# Patient Record
Sex: FEMALE | Race: BLACK OR AFRICAN AMERICAN | NOT HISPANIC OR LATINO | Employment: OTHER | URBAN - METROPOLITAN AREA
[De-identification: names, ages, dates, MRNs, and addresses within clinical notes are randomized per-mention and may not be internally consistent; named-entity substitution may affect disease eponyms.]

---

## 2020-02-07 ENCOUNTER — APPOINTMENT (OUTPATIENT)
Dept: CT IMAGING | Facility: HOSPITAL | Age: 50
End: 2020-02-07

## 2020-02-07 ENCOUNTER — HOSPITAL ENCOUNTER (EMERGENCY)
Facility: HOSPITAL | Age: 50
Discharge: HOME | End: 2020-02-07
Admitting: EMERGENCY MEDICINE

## 2020-02-07 VITALS
BODY MASS INDEX: 43.4 KG/M2 | SYSTOLIC BLOOD PRESSURE: 149 MMHG | DIASTOLIC BLOOD PRESSURE: 87 MMHG | HEART RATE: 62 BPM | RESPIRATION RATE: 14 BRPM | HEIGHT: 69 IN | TEMPERATURE: 98.2 F | OXYGEN SATURATION: 99 % | WEIGHT: 293 LBS

## 2020-02-07 DIAGNOSIS — S39.012A STRAIN OF LUMBAR REGION, INITIAL ENCOUNTER: Primary | ICD-10-CM

## 2020-02-07 LAB
BACTERIA UR QL AUTO: ABNORMAL /HPF
BASOPHILS # BLD AUTO: 0.1 10*3/MM3 (ref 0–0.2)
BASOPHILS NFR BLD AUTO: 1.1 % (ref 0–1.5)
BILIRUB UR QL STRIP: NEGATIVE
CLARITY UR: CLEAR
COLOR UR: YELLOW
DEPRECATED RDW RBC AUTO: 44.2 FL (ref 37–54)
EOSINOPHIL # BLD AUTO: 0.1 10*3/MM3 (ref 0–0.4)
EOSINOPHIL NFR BLD AUTO: 1.2 % (ref 0.3–6.2)
ERYTHROCYTE [DISTWIDTH] IN BLOOD BY AUTOMATED COUNT: 15 % (ref 12.3–15.4)
GLUCOSE UR STRIP-MCNC: NEGATIVE MG/DL
HCT VFR BLD AUTO: 43.6 % (ref 34–46.6)
HGB BLD-MCNC: 14.5 G/DL (ref 12–15.9)
HGB UR QL STRIP.AUTO: ABNORMAL
HYALINE CASTS UR QL AUTO: ABNORMAL /LPF
KETONES UR QL STRIP: NEGATIVE
LEUKOCYTE ESTERASE UR QL STRIP.AUTO: NEGATIVE
LIPASE SERPL-CCNC: 11 U/L (ref 13–60)
LYMPHOCYTES # BLD AUTO: 1.6 10*3/MM3 (ref 0.7–3.1)
LYMPHOCYTES NFR BLD AUTO: 20.5 % (ref 19.6–45.3)
MCH RBC QN AUTO: 27.7 PG (ref 26.6–33)
MCHC RBC AUTO-ENTMCNC: 33.3 G/DL (ref 31.5–35.7)
MCV RBC AUTO: 83.2 FL (ref 79–97)
MONOCYTES # BLD AUTO: 0.4 10*3/MM3 (ref 0.1–0.9)
MONOCYTES NFR BLD AUTO: 5.3 % (ref 5–12)
NEUTROPHILS # BLD AUTO: 5.7 10*3/MM3 (ref 1.7–7)
NEUTROPHILS NFR BLD AUTO: 71.9 % (ref 42.7–76)
NITRITE UR QL STRIP: NEGATIVE
NRBC BLD AUTO-RTO: 0.4 /100 WBC (ref 0–0.2)
PH UR STRIP.AUTO: 5.5 [PH] (ref 5–8)
PLATELET # BLD AUTO: 203 10*3/MM3 (ref 140–450)
PMV BLD AUTO: 7.8 FL (ref 6–12)
PROT UR QL STRIP: NEGATIVE
RBC # BLD AUTO: 5.23 10*6/MM3 (ref 3.77–5.28)
RBC # UR: ABNORMAL /HPF
REF LAB TEST METHOD: ABNORMAL
SP GR UR STRIP: 1.01 (ref 1–1.03)
SQUAMOUS #/AREA URNS HPF: ABNORMAL /HPF
UROBILINOGEN UR QL STRIP: ABNORMAL
WBC NRBC COR # BLD: 7.9 10*3/MM3 (ref 3.4–10.8)
WBC UR QL AUTO: ABNORMAL /HPF

## 2020-02-07 PROCEDURE — 25010000002 HYDROMORPHONE PER 4 MG: Performed by: EMERGENCY MEDICINE

## 2020-02-07 PROCEDURE — 96374 THER/PROPH/DIAG INJ IV PUSH: CPT

## 2020-02-07 PROCEDURE — 25010000002 KETOROLAC TROMETHAMINE PER 15 MG: Performed by: PHYSICIAN ASSISTANT

## 2020-02-07 PROCEDURE — 99283 EMERGENCY DEPT VISIT LOW MDM: CPT

## 2020-02-07 PROCEDURE — 74176 CT ABD & PELVIS W/O CONTRAST: CPT

## 2020-02-07 PROCEDURE — 81001 URINALYSIS AUTO W/SCOPE: CPT | Performed by: PHYSICIAN ASSISTANT

## 2020-02-07 PROCEDURE — 25010000002 PROMETHAZINE PER 50 MG: Performed by: EMERGENCY MEDICINE

## 2020-02-07 PROCEDURE — 96375 TX/PRO/DX INJ NEW DRUG ADDON: CPT

## 2020-02-07 PROCEDURE — 83690 ASSAY OF LIPASE: CPT | Performed by: PHYSICIAN ASSISTANT

## 2020-02-07 PROCEDURE — 85025 COMPLETE CBC W/AUTO DIFF WBC: CPT | Performed by: PHYSICIAN ASSISTANT

## 2020-02-07 RX ORDER — SODIUM CHLORIDE 0.9 % (FLUSH) 0.9 %
10 SYRINGE (ML) INJECTION AS NEEDED
Status: DISCONTINUED | OUTPATIENT
Start: 2020-02-07 | End: 2020-02-07 | Stop reason: HOSPADM

## 2020-02-07 RX ORDER — LIDOCAINE 50 MG/G
1 PATCH TOPICAL EVERY 24 HOURS
Qty: 30 PATCH | Refills: 0 | Status: SHIPPED | OUTPATIENT
Start: 2020-02-07 | End: 2021-06-13

## 2020-02-07 RX ORDER — HYDROMORPHONE HCL 110MG/55ML
0.5 PATIENT CONTROLLED ANALGESIA SYRINGE INTRAVENOUS ONCE
Status: COMPLETED | OUTPATIENT
Start: 2020-02-07 | End: 2020-02-07

## 2020-02-07 RX ORDER — LIDOCAINE 50 MG/G
1 PATCH TOPICAL ONCE
Status: DISCONTINUED | OUTPATIENT
Start: 2020-02-07 | End: 2020-02-07 | Stop reason: HOSPADM

## 2020-02-07 RX ORDER — METHOCARBAMOL 500 MG/1
500 TABLET, FILM COATED ORAL ONCE
Status: COMPLETED | OUTPATIENT
Start: 2020-02-07 | End: 2020-02-07

## 2020-02-07 RX ORDER — METHYLPREDNISOLONE 4 MG/1
TABLET ORAL
Qty: 21 TABLET | Refills: 0 | Status: SHIPPED | OUTPATIENT
Start: 2020-02-07 | End: 2021-06-13

## 2020-02-07 RX ORDER — DICLOFENAC SODIUM 75 MG/1
75 TABLET, DELAYED RELEASE ORAL 2 TIMES DAILY
Qty: 60 TABLET | Refills: 0 | Status: SHIPPED | OUTPATIENT
Start: 2020-02-07 | End: 2021-06-13

## 2020-02-07 RX ORDER — KETOROLAC TROMETHAMINE 15 MG/ML
15 INJECTION, SOLUTION INTRAMUSCULAR; INTRAVENOUS ONCE
Status: COMPLETED | OUTPATIENT
Start: 2020-02-07 | End: 2020-02-07

## 2020-02-07 RX ADMIN — SODIUM CHLORIDE 12.5 MG: 900 INJECTION, SOLUTION INTRAVENOUS at 12:56

## 2020-02-07 RX ADMIN — HYDROMORPHONE HYDROCHLORIDE 0.5 MG: 2 INJECTION, SOLUTION INTRAMUSCULAR; INTRAVENOUS; SUBCUTANEOUS at 12:56

## 2020-02-07 RX ADMIN — SODIUM CHLORIDE 1000 ML: 900 INJECTION, SOLUTION INTRAVENOUS at 12:20

## 2020-02-07 RX ADMIN — METHOCARBAMOL TABLETS 500 MG: 500 TABLET, COATED ORAL at 12:15

## 2020-02-07 RX ADMIN — KETOROLAC TROMETHAMINE 15 MG: 15 INJECTION, SOLUTION INTRAMUSCULAR; INTRAVENOUS at 12:20

## 2020-02-07 RX ADMIN — LIDOCAINE 1 PATCH: 50 PATCH CUTANEOUS at 12:12

## 2020-02-07 NOTE — ED PROVIDER NOTES
Subjective   History:  Patient is a 49-year-old female who presents to the ER with right flank and low back pain over the last 2 days.  She reports that she had a menstrual cycle which she has not had in several years and had low back pain and thought it was cramping but her pain has gotten worse and not better.  Denies any other symptoms.  Denies any leg radiation.  Has no history of nephrolithiasis.  Denies any bowel or bladder incontinence.  Reports she went to urgent care yesterday and had a urine dipstick done which was negative.    Onset: 2 days  Location: right flank/low back  Duration: constant  Character: sharp pain   Aggravating/Alleviating factors: None  Radiation Right buttock  Severity: moderate            Review of Systems   Constitutional: Negative for fatigue and fever.   HENT: Negative.    Respiratory: Negative for cough and shortness of breath.    Cardiovascular: Negative for chest pain and leg swelling.   Gastrointestinal: Negative for abdominal distention, abdominal pain, nausea and vomiting.   Genitourinary: Negative.    Musculoskeletal: Positive for back pain.   Skin: Negative.    Neurological: Negative.    Psychiatric/Behavioral: Negative.        Past Medical History:   Diagnosis Date   • A-fib (CMS/[facility])    • Anxiety        Allergies   Allergen Reactions   • Penicillins Swelling       No past surgical history on file.    Family History   Problem Relation Age of Onset   • Hypertension Mother    • Hypertension Father    • Diabetes Father    • Breast cancer Maternal Grandmother    • Diabetes Paternal Grandmother        Social History     Socioeconomic History   • Marital status:      Spouse name: Not on file   • Number of children: Not on file   • Years of education: Not on file   • Highest education level: Not on file   Tobacco Use   • Smoking status: Never Smoker   Substance and Sexual Activity   • Alcohol use: Yes     Comment: occ/social           Objective   Physical Exam    Constitutional: She is oriented to person, place, and time. She appears well-developed and well-nourished.   HENT:   Head: Normocephalic and atraumatic.   Eyes: Pupils are equal, round, and reactive to light.   Neck: Normal range of motion.   Pulmonary/Chest: Effort normal.   Musculoskeletal: Normal range of motion.   No increase in pain with palpation of low back.  Some moderate increase in pain with flexion extension.   Neurological: She is alert and oriented to person, place, and time.   Skin: Skin is warm and dry.   Psychiatric: She has a normal mood and affect. Her behavior is normal.       Procedures           ED Course      Ct Abdomen Pelvis Without Contrast    Result Date: 2/7/2020   1. No acute findings in the abdomen or pelvis. No urinary tract stone or hydronephrosis.    Electronically Signed By-Dr. Abigail Muniz MD On:2/7/2020 1:25 PM This report was finalized on [id number] by Dr. Abigail Muniz MD.    Labs Reviewed   URINALYSIS W/ CULTURE IF INDICATED - Abnormal; Notable for the following components:       Result Value    Blood, UA Moderate (2+) (*)     All other components within normal limits   LIPASE - Abnormal; Notable for the following components:    Lipase 11 (*)     All other components within normal limits   CBC WITH AUTO DIFFERENTIAL - Abnormal; Notable for the following components:    nRBC 0.4 (*)     All other components within normal limits   URINALYSIS, MICROSCOPIC ONLY - Abnormal; Notable for the following components:    RBC, UA 0-2 (*)     All other components within normal limits   CBC AND DIFFERENTIAL    Narrative:     The following orders were created for panel order CBC & Differential.  Procedure                               Abnormality         Status                     ---------                               -----------         ------                     CBC Auto Differential[[id number]]        Abnormal            Final result                 Please view results for these  tests on the individual orders.     Medications   sodium chloride 0.9 % flush 10 mL (has no administration in time range)   lidocaine (LIDODERM) 5 % 1 patch (1 patch Transdermal Medication Applied 2/7/20 1212)   sodium chloride 0.9 % bolus 1,000 mL (1,000 mL Intravenous New Bag 2/7/20 1220)   ketorolac (TORADOL) injection 15 mg (15 mg Intravenous Given 2/7/20 1220)   methocarbamol (ROBAXIN) tablet 500 mg (500 mg Oral Given 2/7/20 1215)   promethazine (PHENERGAN) 12.5 mg in sodium chloride 0.9 % 50 mL (12.5 mg Intravenous Given 2/7/20 1256)   HYDROmorphone (DILAUDID) injection 0.5 mg (0.5 mg Intravenous Given 2/7/20 1256)                                              MDM  Number of Diagnoses or Management Options  Strain of lumbar region, initial encounter:   Diagnosis management comments: DISPOSITION:   Chart Review:  Comorbidity:  has a past medical history of A-fib (CMS/HCC) and Anxiety.  Differentials:this list is not all inclusive and does not constitute the entirety of considered causes --> Lumbar strain, UTI, nephrolithiasis, herniated disc  Labs: lipase 11, UA hematuria     Imaging: Was interpreted by physician and reviewed by myself:  Ct Abdomen Pelvis Without Contrast    Result Date: 2/7/2020   1. No acute findings in the abdomen or pelvis. No urinary tract stone or hydronephrosis.    Electronically Signed By-Dr. Abigail Muniz MD On:2/7/2020 1:25 PM This report was finalized on [id number] by Dr. Abigail Muniz MD.      Disposition/Treatment:  Patient is a 49-year-old female presents to the ER with right low back pain.  She did have hematuria in her urine CT scan was negative.  Patient was initially given Toradol and Robaxin without significant relief.  She was given Dilaudid.  She was discharged home with diclofenac lidocaine patches and Medrol Dosepak she is already on Flexeril.  She was told to follow-up with her PCP and given referrals.  She was stable at time of discharge return precaution follow-up  instruction provided she was stable and in agreement plan       Amount and/or Complexity of Data Reviewed  Clinical lab tests: reviewed  Tests in the radiology section of CPT®: reviewed    Patient Progress  Patient progress: stable      Final diagnoses:   Strain of lumbar region, initial encounter            Filomena East PA-C  02/07/20 2041

## 2020-02-07 NOTE — DISCHARGE INSTRUCTIONS
Return to the ER for any worsening symptoms  Follow up with your primary care provider for any further management. If you do not have a PCP see the above referrals.   Ice/heat for 15 minutes at a time for next two days.

## 2020-11-03 ENCOUNTER — APPOINTMENT (OUTPATIENT)
Dept: GENERAL RADIOLOGY | Facility: HOSPITAL | Age: 50
End: 2020-11-03

## 2020-11-03 ENCOUNTER — HOSPITAL ENCOUNTER (EMERGENCY)
Facility: HOSPITAL | Age: 50
Discharge: HOME | End: 2020-11-03
Admitting: EMERGENCY MEDICINE

## 2020-11-03 VITALS
RESPIRATION RATE: 18 BRPM | TEMPERATURE: 98.4 F | BODY MASS INDEX: 44.41 KG/M2 | HEART RATE: 84 BPM | HEIGHT: 68 IN | WEIGHT: 293 LBS | OXYGEN SATURATION: 95 % | SYSTOLIC BLOOD PRESSURE: 139 MMHG | DIASTOLIC BLOOD PRESSURE: 71 MMHG

## 2020-11-03 DIAGNOSIS — R06.09 DYSPNEA ON EXERTION: ICD-10-CM

## 2020-11-03 DIAGNOSIS — Z86.79 HISTORY OF ATRIAL FIBRILLATION: ICD-10-CM

## 2020-11-03 DIAGNOSIS — U07.1 CLINICAL DIAGNOSIS OF COVID-19: Primary | ICD-10-CM

## 2020-11-03 DIAGNOSIS — R00.2 PALPITATIONS: ICD-10-CM

## 2020-11-03 LAB
ALBUMIN SERPL-MCNC: 3.6 G/DL (ref 3.5–5.2)
ALBUMIN/GLOB SERPL: 0.9 G/DL
ALP SERPL-CCNC: 73 U/L (ref 39–117)
ALT SERPL W P-5'-P-CCNC: 32 U/L (ref 1–33)
ANION GAP SERPL CALCULATED.3IONS-SCNC: 11 MMOL/L (ref 5–15)
AST SERPL-CCNC: 35 U/L (ref 1–32)
BASOPHILS # BLD AUTO: 0 10*3/MM3 (ref 0–0.2)
BASOPHILS NFR BLD AUTO: 0.8 % (ref 0–1.5)
BILIRUB SERPL-MCNC: 0.4 MG/DL (ref 0–1.2)
BUN SERPL-MCNC: 7 MG/DL (ref 6–20)
BUN/CREAT SERPL: 8.4 (ref 7–25)
CALCIUM SPEC-SCNC: 8.6 MG/DL (ref 8.6–10.5)
CHLORIDE SERPL-SCNC: 98 MMOL/L (ref 98–107)
CO2 SERPL-SCNC: 29 MMOL/L (ref 22–29)
CREAT SERPL-MCNC: 0.83 MG/DL (ref 0.57–1)
D DIMER PPP FEU-MCNC: 0.23 MG/L (FEU) (ref 0–0.59)
DEPRECATED RDW RBC AUTO: 42.9 FL (ref 37–54)
EOSINOPHIL # BLD AUTO: 0 10*3/MM3 (ref 0–0.4)
EOSINOPHIL NFR BLD AUTO: 0.2 % (ref 0.3–6.2)
ERYTHROCYTE [DISTWIDTH] IN BLOOD BY AUTOMATED COUNT: 14.9 % (ref 12.3–15.4)
GFR SERPL CREATININE-BSD FRML MDRD: 87 ML/MIN/1.73
GLOBULIN UR ELPH-MCNC: 3.8 GM/DL
GLUCOSE SERPL-MCNC: 108 MG/DL (ref 65–99)
HCT VFR BLD AUTO: 40.9 % (ref 34–46.6)
HGB BLD-MCNC: 13.2 G/DL (ref 12–15.9)
HOLD SPECIMEN: NORMAL
LYMPHOCYTES # BLD AUTO: 0.9 10*3/MM3 (ref 0.7–3.1)
LYMPHOCYTES NFR BLD AUTO: 15.4 % (ref 19.6–45.3)
MCH RBC QN AUTO: 26.7 PG (ref 26.6–33)
MCHC RBC AUTO-ENTMCNC: 32.2 G/DL (ref 31.5–35.7)
MCV RBC AUTO: 83 FL (ref 79–97)
MONOCYTES # BLD AUTO: 0.3 10*3/MM3 (ref 0.1–0.9)
MONOCYTES NFR BLD AUTO: 5.2 % (ref 5–12)
NEUTROPHILS NFR BLD AUTO: 4.7 10*3/MM3 (ref 1.7–7)
NEUTROPHILS NFR BLD AUTO: 78.4 % (ref 42.7–76)
NRBC BLD AUTO-RTO: 0.1 /100 WBC (ref 0–0.2)
NT-PROBNP SERPL-MCNC: 15.7 PG/ML (ref 0–900)
PLATELET # BLD AUTO: 126 10*3/MM3 (ref 140–450)
PMV BLD AUTO: 8.3 FL (ref 6–12)
POTASSIUM SERPL-SCNC: 3.9 MMOL/L (ref 3.5–5.2)
PROT SERPL-MCNC: 7.4 G/DL (ref 6–8.5)
RBC # BLD AUTO: 4.93 10*6/MM3 (ref 3.77–5.28)
SODIUM SERPL-SCNC: 138 MMOL/L (ref 136–145)
TROPONIN T SERPL-MCNC: <0.01 NG/ML (ref 0–0.03)
WBC # BLD AUTO: 6 10*3/MM3 (ref 3.4–10.8)

## 2020-11-03 PROCEDURE — 71045 X-RAY EXAM CHEST 1 VIEW: CPT

## 2020-11-03 PROCEDURE — 85379 FIBRIN DEGRADATION QUANT: CPT | Performed by: NURSE PRACTITIONER

## 2020-11-03 PROCEDURE — 85025 COMPLETE CBC W/AUTO DIFF WBC: CPT | Performed by: NURSE PRACTITIONER

## 2020-11-03 PROCEDURE — 93005 ELECTROCARDIOGRAM TRACING: CPT | Performed by: NURSE PRACTITIONER

## 2020-11-03 PROCEDURE — 99284 EMERGENCY DEPT VISIT MOD MDM: CPT

## 2020-11-03 PROCEDURE — 96374 THER/PROPH/DIAG INJ IV PUSH: CPT

## 2020-11-03 PROCEDURE — 99283 EMERGENCY DEPT VISIT LOW MDM: CPT

## 2020-11-03 PROCEDURE — 84484 ASSAY OF TROPONIN QUANT: CPT | Performed by: NURSE PRACTITIONER

## 2020-11-03 PROCEDURE — 25010000002 METHYLPREDNISOLONE PER 125 MG: Performed by: NURSE PRACTITIONER

## 2020-11-03 PROCEDURE — 83880 ASSAY OF NATRIURETIC PEPTIDE: CPT | Performed by: NURSE PRACTITIONER

## 2020-11-03 PROCEDURE — 80053 COMPREHEN METABOLIC PANEL: CPT | Performed by: NURSE PRACTITIONER

## 2020-11-03 RX ORDER — SODIUM CHLORIDE 0.9 % (FLUSH) 0.9 %
10 SYRINGE (ML) INJECTION AS NEEDED
Status: DISCONTINUED | OUTPATIENT
Start: 2020-11-03 | End: 2020-11-03 | Stop reason: HOSPADM

## 2020-11-03 RX ORDER — PREDNISONE 20 MG/1
20 TABLET ORAL DAILY
Qty: 5 TABLET | Refills: 0 | Status: SHIPPED | OUTPATIENT
Start: 2020-11-03 | End: 2021-06-13

## 2020-11-03 RX ORDER — METHYLPREDNISOLONE SODIUM SUCCINATE 125 MG/2ML
125 INJECTION, POWDER, LYOPHILIZED, FOR SOLUTION INTRAMUSCULAR; INTRAVENOUS ONCE
Status: COMPLETED | OUTPATIENT
Start: 2020-11-03 | End: 2020-11-03

## 2020-11-03 RX ADMIN — METHYLPREDNISOLONE SODIUM SUCCINATE 125 MG: 125 INJECTION, POWDER, FOR SOLUTION INTRAMUSCULAR; INTRAVENOUS at 15:55

## 2020-11-03 NOTE — ED PROVIDER NOTES
Subjective   Is a 50-year-old morbidly obese female who states that she was tested and diagnosed with COVID-19 last Wednesday.-She states she has been doing well at home but recently she has had some palpitations her anxiety has increased and she feels more short of breath than normal.  She has had no fever no chills no cough or congestion.-She states she has pain that is chronic she states she always has pain that is all over in her body and mainly in her joints.  He rates it a 6/10.  Denies exacerbating or alleviating factors          Review of Systems   Constitutional: Negative for chills, fatigue and fever.   HENT: Negative for congestion, tinnitus and trouble swallowing.    Eyes: Negative for photophobia, discharge and redness.   Respiratory: Positive for shortness of breath. Negative for cough.    Cardiovascular: Positive for palpitations. Negative for chest pain.   Gastrointestinal: Negative for abdominal pain, diarrhea, nausea and vomiting.   Genitourinary: Negative for dysuria, frequency and urgency.   Musculoskeletal: Negative for back pain, joint swelling and myalgias.   Skin: Negative for rash.   Neurological: Negative for dizziness and headaches.   Psychiatric/Behavioral: Negative for confusion.   All other systems reviewed and are negative.      Past Medical History:   Diagnosis Date   • A-fib (CMS/[facility])    • Anxiety        Allergies   Allergen Reactions   • Penicillins Swelling       History reviewed. No pertinent surgical history.    Family History   Problem Relation Age of Onset   • Hypertension Mother    • Hypertension Father    • Diabetes Father    • Breast cancer Maternal Grandmother    • Diabetes Paternal Grandmother        Social History     Socioeconomic History   • Marital status:      Spouse name: Not on file   • Number of children: Not on file   • Years of education: Not on file   • Highest education level: Not on file   Tobacco Use   • Smoking status: Never Smoker   Substance and  Sexual Activity   • Alcohol use: Yes     Comment: occ/social           Objective   Physical Exam  Vitals signs reviewed.   Constitutional:       Appearance: She is well-developed. She is obese.   HENT:      Head: Normocephalic and atraumatic.      Mouth/Throat:      Mouth: Mucous membranes are moist.   Eyes:      Conjunctiva/sclera: Conjunctivae normal.      Pupils: Pupils are equal, round, and reactive to light.   Neck:      Musculoskeletal: Normal range of motion and neck supple.   Cardiovascular:      Rate and Rhythm: Normal rate and regular rhythm.      Heart sounds: Normal heart sounds.   Pulmonary:      Effort: Pulmonary effort is normal. No respiratory distress.      Breath sounds: Examination of the right-lower field reveals decreased breath sounds. Examination of the left-lower field reveals decreased breath sounds. Decreased breath sounds present. No wheezing.   Chest:      Chest wall: Crepitus present. No mass, deformity or tenderness.   Abdominal:      General: Bowel sounds are normal. There is no distension.      Palpations: Abdomen is soft. There is no mass.      Tenderness: There is no abdominal tenderness. There is no guarding or rebound.   Musculoskeletal: Normal range of motion.         General: No deformity.      Right lower leg: No edema.      Left lower leg: She exhibits no tenderness. No edema.   Skin:     General: Skin is warm and dry.      Capillary Refill: Capillary refill takes less than 2 seconds.   Neurological:      General: No focal deficit present.      Mental Status: She is alert and oriented to person, place, and time.      GCS: GCS eye subscore is 4. GCS verbal subscore is 5. GCS motor subscore is 6.      Cranial Nerves: No cranial nerve deficit.      Sensory: No sensory deficit.      Deep Tendon Reflexes: Reflexes normal.   Psychiatric:         Mood and Affect: Mood is anxious.         Behavior: Behavior normal.         Procedures         EKG shows sinus rhythm with a rate of 95 no  ectopy no ST elevation no previous was obtained are available-reviewed by myself read by Dr. Lima  ED Course                                               MDM    Final diagnoses:   Clinical diagnosis of COVID-19   Dyspnea on exertion   Palpitations   History of atrial fibrillation            Karmen Chapa, APRN  11/08/20 2003

## 2020-11-03 NOTE — ED NOTES
At this time the patient is on room air and her O2 is at 98%.  Patient denies using oxygen at home.     Kaylan Cronin RN  11/03/20 1533

## 2020-11-03 NOTE — ED NOTES
Patient states that she feels short of breath all the time and she states she is having heart palpitations.  Patient states that she tested COVID positive on Wednesday October the 28th and started having symptoms on the Oz the 25th.        Kaylan Cronin, RN  11/03/20 1531

## 2020-11-06 LAB — QT INTERVAL: 354 MS

## 2021-06-13 ENCOUNTER — APPOINTMENT (OUTPATIENT)
Dept: GENERAL RADIOLOGY | Facility: HOSPITAL | Age: 51
End: 2021-06-13

## 2021-06-13 ENCOUNTER — APPOINTMENT (OUTPATIENT)
Dept: CT IMAGING | Facility: HOSPITAL | Age: 51
End: 2021-06-13

## 2021-06-13 ENCOUNTER — HOSPITAL ENCOUNTER (OUTPATIENT)
Facility: HOSPITAL | Age: 51
Setting detail: OBSERVATION
Discharge: HOME | End: 2021-06-14
Attending: EMERGENCY MEDICINE | Admitting: EMERGENCY MEDICINE

## 2021-06-13 DIAGNOSIS — R55 SYNCOPE, UNSPECIFIED SYNCOPE TYPE: Primary | ICD-10-CM

## 2021-06-13 DIAGNOSIS — S82.55XA NONDISPLACED FRACTURE OF MEDIAL MALLEOLUS OF LEFT TIBIA, INITIAL ENCOUNTER FOR CLOSED FRACTURE: ICD-10-CM

## 2021-06-13 DIAGNOSIS — S80.02XA CONTUSION OF LEFT KNEE, INITIAL ENCOUNTER: ICD-10-CM

## 2021-06-13 LAB
ABO GROUP BLD: NORMAL
ALBUMIN SERPL-MCNC: 4.1 G/DL (ref 3.5–5.2)
ALBUMIN/GLOB SERPL: 1.1 G/DL
ALP SERPL-CCNC: 101 U/L (ref 39–117)
ALT SERPL W P-5'-P-CCNC: 11 U/L (ref 1–33)
ANION GAP SERPL CALCULATED.3IONS-SCNC: 13 MMOL/L (ref 5–15)
APTT PPP: 24.6 SECONDS (ref 24–31)
AST SERPL-CCNC: 15 U/L (ref 1–32)
BASOPHILS # BLD AUTO: 0.1 10*3/MM3 (ref 0–0.2)
BASOPHILS NFR BLD AUTO: 0.6 % (ref 0–1.5)
BILIRUB SERPL-MCNC: 0.3 MG/DL (ref 0–1.2)
BLD GP AB SCN SERPL QL: NEGATIVE
BUN SERPL-MCNC: 16 MG/DL (ref 6–20)
BUN/CREAT SERPL: 15.5 (ref 7–25)
CALCIUM SPEC-SCNC: 9.3 MG/DL (ref 8.6–10.5)
CHLORIDE SERPL-SCNC: 101 MMOL/L (ref 98–107)
CO2 SERPL-SCNC: 24 MMOL/L (ref 22–29)
CREAT SERPL-MCNC: 1.03 MG/DL (ref 0.57–1)
DEPRECATED RDW RBC AUTO: 45.1 FL (ref 37–54)
EOSINOPHIL # BLD AUTO: 0.1 10*3/MM3 (ref 0–0.4)
EOSINOPHIL NFR BLD AUTO: 0.8 % (ref 0.3–6.2)
ERYTHROCYTE [DISTWIDTH] IN BLOOD BY AUTOMATED COUNT: 15.5 % (ref 12.3–15.4)
GFR SERPL CREATININE-BSD FRML MDRD: 68 ML/MIN/1.73
GLOBULIN UR ELPH-MCNC: 3.9 GM/DL
GLUCOSE SERPL-MCNC: 69 MG/DL (ref 65–99)
HCT VFR BLD AUTO: 40.2 % (ref 34–46.6)
HGB BLD-MCNC: 13.1 G/DL (ref 12–15.9)
INR PPP: 0.96 (ref 0.93–1.1)
LYMPHOCYTES # BLD AUTO: 1.8 10*3/MM3 (ref 0.7–3.1)
LYMPHOCYTES NFR BLD AUTO: 15.6 % (ref 19.6–45.3)
MCH RBC QN AUTO: 27.4 PG (ref 26.6–33)
MCHC RBC AUTO-ENTMCNC: 32.7 G/DL (ref 31.5–35.7)
MCV RBC AUTO: 83.8 FL (ref 79–97)
MONOCYTES # BLD AUTO: 0.7 10*3/MM3 (ref 0.1–0.9)
MONOCYTES NFR BLD AUTO: 6.3 % (ref 5–12)
NEUTROPHILS NFR BLD AUTO: 76.7 % (ref 42.7–76)
NEUTROPHILS NFR BLD AUTO: 8.8 10*3/MM3 (ref 1.7–7)
NRBC BLD AUTO-RTO: 0 /100 WBC (ref 0–0.2)
NT-PROBNP SERPL-MCNC: 19.4 PG/ML (ref 0–900)
PLATELET # BLD AUTO: 187 10*3/MM3 (ref 140–450)
PMV BLD AUTO: 8.2 FL (ref 6–12)
POTASSIUM SERPL-SCNC: 3.9 MMOL/L (ref 3.5–5.2)
PROT SERPL-MCNC: 8 G/DL (ref 6–8.5)
PROTHROMBIN TIME: 10.6 SECONDS (ref 9.6–11.7)
RBC # BLD AUTO: 4.79 10*6/MM3 (ref 3.77–5.28)
RH BLD: POSITIVE
SARS-COV-2 RNA PNL SPEC NAA+PROBE: NOT DETECTED
SODIUM SERPL-SCNC: 138 MMOL/L (ref 136–145)
T&S EXPIRATION DATE: NORMAL
TROPONIN T SERPL-MCNC: <0.01 NG/ML (ref 0–0.03)
WBC # BLD AUTO: 11.4 10*3/MM3 (ref 3.4–10.8)

## 2021-06-13 PROCEDURE — 83880 ASSAY OF NATRIURETIC PEPTIDE: CPT | Performed by: EMERGENCY MEDICINE

## 2021-06-13 PROCEDURE — 99285 EMERGENCY DEPT VISIT HI MDM: CPT

## 2021-06-13 PROCEDURE — 86850 RBC ANTIBODY SCREEN: CPT | Performed by: EMERGENCY MEDICINE

## 2021-06-13 PROCEDURE — 85730 THROMBOPLASTIN TIME PARTIAL: CPT | Performed by: EMERGENCY MEDICINE

## 2021-06-13 PROCEDURE — 86901 BLOOD TYPING SEROLOGIC RH(D): CPT

## 2021-06-13 PROCEDURE — 73564 X-RAY EXAM KNEE 4 OR MORE: CPT

## 2021-06-13 PROCEDURE — 36415 COLL VENOUS BLD VENIPUNCTURE: CPT | Performed by: EMERGENCY MEDICINE

## 2021-06-13 PROCEDURE — C9803 HOPD COVID-19 SPEC COLLECT: HCPCS

## 2021-06-13 PROCEDURE — G0378 HOSPITAL OBSERVATION PER HR: HCPCS

## 2021-06-13 PROCEDURE — 85025 COMPLETE CBC W/AUTO DIFF WBC: CPT | Performed by: EMERGENCY MEDICINE

## 2021-06-13 PROCEDURE — 86900 BLOOD TYPING SEROLOGIC ABO: CPT

## 2021-06-13 PROCEDURE — 70450 CT HEAD/BRAIN W/O DYE: CPT

## 2021-06-13 PROCEDURE — 86900 BLOOD TYPING SEROLOGIC ABO: CPT | Performed by: EMERGENCY MEDICINE

## 2021-06-13 PROCEDURE — 84484 ASSAY OF TROPONIN QUANT: CPT | Performed by: EMERGENCY MEDICINE

## 2021-06-13 PROCEDURE — 71045 X-RAY EXAM CHEST 1 VIEW: CPT

## 2021-06-13 PROCEDURE — 80053 COMPREHEN METABOLIC PANEL: CPT | Performed by: EMERGENCY MEDICINE

## 2021-06-13 PROCEDURE — 86901 BLOOD TYPING SEROLOGIC RH(D): CPT | Performed by: EMERGENCY MEDICINE

## 2021-06-13 PROCEDURE — 85610 PROTHROMBIN TIME: CPT | Performed by: EMERGENCY MEDICINE

## 2021-06-13 PROCEDURE — U0003 INFECTIOUS AGENT DETECTION BY NUCLEIC ACID (DNA OR RNA); SEVERE ACUTE RESPIRATORY SYNDROME CORONAVIRUS 2 (SARS-COV-2) (CORONAVIRUS DISEASE [COVID-19]), AMPLIFIED PROBE TECHNIQUE, MAKING USE OF HIGH THROUGHPUT TECHNOLOGIES AS DESCRIBED BY CMS-2020-01-R: HCPCS | Performed by: EMERGENCY MEDICINE

## 2021-06-13 PROCEDURE — 73610 X-RAY EXAM OF ANKLE: CPT

## 2021-06-13 PROCEDURE — 93005 ELECTROCARDIOGRAM TRACING: CPT | Performed by: EMERGENCY MEDICINE

## 2021-06-13 RX ORDER — ACETAMINOPHEN 325 MG/1
650 TABLET ORAL EVERY 4 HOURS PRN
Status: DISCONTINUED | OUTPATIENT
Start: 2021-06-13 | End: 2021-06-14 | Stop reason: HOSPADM

## 2021-06-13 RX ORDER — SODIUM CHLORIDE 0.9 % (FLUSH) 0.9 %
10 SYRINGE (ML) INJECTION AS NEEDED
Status: DISCONTINUED | OUTPATIENT
Start: 2021-06-13 | End: 2021-06-14 | Stop reason: HOSPADM

## 2021-06-13 RX ORDER — HYDROCODONE BITARTRATE AND ACETAMINOPHEN 10; 325 MG/1; MG/1
1 TABLET ORAL EVERY 4 HOURS PRN
Status: DISCONTINUED | OUTPATIENT
Start: 2021-06-13 | End: 2021-06-14 | Stop reason: HOSPADM

## 2021-06-13 RX ORDER — HYDROCODONE BITARTRATE AND ACETAMINOPHEN 7.5; 325 MG/1; MG/1
1 TABLET ORAL ONCE
Status: COMPLETED | OUTPATIENT
Start: 2021-06-13 | End: 2021-06-13

## 2021-06-13 RX ORDER — ONDANSETRON 2 MG/ML
4 INJECTION INTRAMUSCULAR; INTRAVENOUS EVERY 6 HOURS PRN
Status: DISCONTINUED | OUTPATIENT
Start: 2021-06-13 | End: 2021-06-14 | Stop reason: HOSPADM

## 2021-06-13 RX ORDER — CHOLECALCIFEROL (VITAMIN D3) 125 MCG
5 CAPSULE ORAL NIGHTLY PRN
Status: DISCONTINUED | OUTPATIENT
Start: 2021-06-13 | End: 2021-06-14 | Stop reason: HOSPADM

## 2021-06-13 RX ORDER — SODIUM CHLORIDE 0.9 % (FLUSH) 0.9 %
10 SYRINGE (ML) INJECTION EVERY 12 HOURS SCHEDULED
Status: DISCONTINUED | OUTPATIENT
Start: 2021-06-13 | End: 2021-06-14 | Stop reason: HOSPADM

## 2021-06-13 RX ADMIN — HYDROCODONE BITARTRATE AND ACETAMINOPHEN 1 TABLET: 7.5; 325 TABLET ORAL at 22:07

## 2021-06-13 RX ADMIN — SODIUM CHLORIDE 1000 ML: 9 INJECTION, SOLUTION INTRAVENOUS at 19:39

## 2021-06-13 RX ADMIN — Medication 10 ML: at 22:50

## 2021-06-13 NOTE — ED PROVIDER NOTES
Subjective   History of Present Illness  Syncope  50-year-old female states she was shopping today and started feeling lightheaded had a slight bit of nausea and had a brief syncopal spell with a friend and the Nexium she remembered she was on the floor.  States she does have some knee pain from falling but denies headache or chest pain or palpitation or shortness of breath.  She reports no abdominal pain or fevers or chills or vomiting or diarrhea.  She states she feels fine currently other than the knee pain.  She denies numbness or weakness in the extremities  Review of Systems   Constitutional: Negative.    HENT: Negative.    Eyes: Negative.    Respiratory: Negative.    Cardiovascular: Negative.    Gastrointestinal: Negative.    Genitourinary: Negative.    Musculoskeletal: Negative.    Skin: Negative.    Neurological: Positive for syncope. Negative for seizures, weakness, numbness and headaches.   Psychiatric/Behavioral: Negative.        Past Medical History:   Diagnosis Date   • A-fib (CMS/[facility])    • Anxiety        Allergies   Allergen Reactions   • Penicillins Swelling       No past surgical history on file.    Family History   Problem Relation Age of Onset   • Hypertension Mother    • Hypertension Father    • Diabetes Father    • Breast cancer Maternal Grandmother    • Diabetes Paternal Grandmother        Social History     Socioeconomic History   • Marital status:      Spouse name: Not on file   • Number of children: Not on file   • Years of education: Not on file   • Highest education level: Not on file   Tobacco Use   • Smoking status: Never Smoker   Substance and Sexual Activity   • Alcohol use: Yes     Comment: occ/social     Prior to Admission medications    Medication Sig Start Date End Date Taking? Authorizing Provider   apixaban (ELIQUIS) 5 MG tablet tablet TAKE 1 TABLET BY MOUTH TWO TIMES A DAY 8/22/19   Emergency, Nurse Epic, RN   cyclobenzaprine (FLEXERIL) 5 MG tablet 1 pill at night for the  "next 10 days for pain, will cause drowsiness 9/10/19   Gato Kuo MD   diclofenac (VOLTAREN) 75 MG EC tablet Take 1 tablet by mouth 2 (Two) Times a Day. 2/7/20   Filomena East PA-C   lidocaine (LIDODERM) 5 % Place 1 patch on the skin as directed by provider Daily. Remove & Discard patch within 12 hours or as directed by MD 2/7/20   Filomena East PA-C   methylPREDNISolone (MEDROL, ROCK,) 4 MG tablet Take as directed on package instructions. 2/7/20   Filomena East PA-C   metoprolol succinate XL (TOPROL-XL) 100 MG 24 hr tablet Take 100 mg by mouth Daily. 3/20/19   Emergency, Nurse Epic, RN   PARoxetine CR (PAXIL-CR) 12.5 MG 24 hr tablet Take 12.5 mg by mouth Daily.    Emergency, Nurse HARMEET Faria   predniSONE (DELTASONE) 20 MG tablet Take 1 tablet by mouth Daily. 11/3/20   Karmen Chapa, APRN     /74   Pulse 97   Temp 98.1 °F (36.7 °C)   Resp 17   Ht 172.7 cm (68\")   Wt (!) 147 kg (324 lb)   SpO2 100%   BMI 49.26 kg/m²   I examined the patient using the appropriate personal protective equipment.          Objective   Physical Exam  General: Obese female no acute distress, awake alert and pleasant  Eyes: Pupils round and reactive, sclera nonicteric  HEENT: Mucous membranes moist, no mucosal swelling, normocephalic, atraumatic  Neck: Supple, no nuchal rigidity, no lymphadenopathy  Respirations: Respirations nonlabored, equal breath sounds bilaterally, clear lungs  Heart regular rate and rhythm, no murmurs rubs or gallops,   Abdomen soft nontender nondistended, no hepatosplenomegaly,   Extremities no clubbing cyanosis or edema, calves are symmetric and nontender; there is some mild tenderness with patient in the anterior aspect the left knee, no apparent ligamentous laxity normal quadriceps and patellar tendon strength, no deformity, hip exam normal, some mild soft tissue swelling about the left ankle, calves and thighs are symmetric and nontender, normal pulses and sensorimotor function " distally  Neuro cranial nerves II through XII intact , normal sensory/motor function and strength in four extremities, no slurred speech, no facial droop, normal finger to nose, normal heel to shin, no nuchal rigidity  Psych oriented, pleasant affect  Skin no rash, brisk cap refill  Procedures           ED Course      Results for orders placed or performed during the hospital encounter of 06/13/21   Comprehensive Metabolic Panel    Specimen: Blood   Result Value Ref Range    Glucose 69 65 - 99 mg/dL    BUN 16 6 - 20 mg/dL    Creatinine 1.03 (H) 0.57 - 1.00 mg/dL    Sodium 138 136 - 145 mmol/L    Potassium 3.9 3.5 - 5.2 mmol/L    Chloride 101 98 - 107 mmol/L    CO2 24.0 22.0 - 29.0 mmol/L    Calcium 9.3 8.6 - 10.5 mg/dL    Total Protein 8.0 6.0 - 8.5 g/dL    Albumin 4.10 3.50 - 5.20 g/dL    ALT (SGPT) 11 1 - 33 U/L    AST (SGOT) 15 1 - 32 U/L    Alkaline Phosphatase 101 39 - 117 U/L    Total Bilirubin 0.3 0.0 - 1.2 mg/dL    eGFR  African Amer 68 >60 mL/min/1.73    Globulin 3.9 gm/dL    A/G Ratio 1.1 g/dL    BUN/Creatinine Ratio 15.5 7.0 - 25.0    Anion Gap 13.0 5.0 - 15.0 mmol/L   Troponin    Specimen: Blood   Result Value Ref Range    Troponin T <0.010 0.000 - 0.030 ng/mL   BNP    Specimen: Blood   Result Value Ref Range    proBNP 19.4 0.0 - 900.0 pg/mL   Protime-INR    Specimen: Blood   Result Value Ref Range    Protime 10.6 9.6 - 11.7 Seconds    INR 0.96 0.93 - 1.10   aPTT    Specimen: Blood   Result Value Ref Range    PTT 24.6 24.0 - 31.0 seconds   CBC Auto Differential    Specimen: Blood   Result Value Ref Range    WBC 11.40 (H) 3.40 - 10.80 10*3/mm3    RBC 4.79 3.77 - 5.28 10*6/mm3    Hemoglobin 13.1 12.0 - 15.9 g/dL    Hematocrit 40.2 34.0 - 46.6 %    MCV 83.8 79.0 - 97.0 fL    MCH 27.4 26.6 - 33.0 pg    MCHC 32.7 31.5 - 35.7 g/dL    RDW 15.5 (H) 12.3 - 15.4 %    RDW-SD 45.1 37.0 - 54.0 fl    MPV 8.2 6.0 - 12.0 fL    Platelets 187 140 - 450 10*3/mm3    Neutrophil % 76.7 (H) 42.7 - 76.0 %    Lymphocyte % 15.6  (L) 19.6 - 45.3 %    Monocyte % 6.3 5.0 - 12.0 %    Eosinophil % 0.8 0.3 - 6.2 %    Basophil % 0.6 0.0 - 1.5 %    Neutrophils, Absolute 8.80 (H) 1.70 - 7.00 10*3/mm3    Lymphocytes, Absolute 1.80 0.70 - 3.10 10*3/mm3    Monocytes, Absolute 0.70 0.10 - 0.90 10*3/mm3    Eosinophils, Absolute 0.10 0.00 - 0.40 10*3/mm3    Basophils, Absolute 0.10 0.00 - 0.20 10*3/mm3    nRBC 0.0 0.0 - 0.2 /100 WBC   ECG 12 Lead   Result Value Ref Range    QT Interval 351 ms   Type & Screen    Specimen: Blood   Result Value Ref Range    ABO Type B     RH type Positive     Antibody Screen Negative     T&S Expiration Date 6/16/2021 11:59:59 PM      XR Knee 4+ View Left    Result Date: 6/13/2021   Degenerative changes of the left knee.  No acute bony abnormality or joint effusion.  Electronically Signed By-Domingo Quiroz MD On:6/13/2021 7:46 PM This report was finalized on [id number] by  Domingo Quiroz MD.    XR Ankle 3+ View Left    Result Date: 6/13/2021   1.  Acute nondisplaced fracture the distal tip of the medial malleolus. 2.  Small bony fragment along the distal tip of the lateral malleolus which may represent a small age-indeterminate avulsion fracture.  No lateral soft tissue swelling.  Electronically Signed By-Domingo Quiroz MD On:6/13/2021 7:48 PM This report was finalized on [id number] by  Domingo Quiroz MD.    XR Chest 1 View    Result Date: 6/13/2021   1. No acute cardiopulmonary disease.   Electronically Signed By-Domingo Quiroz MD On:6/13/2021 7:49 PM This report was finalized on [id number] by  Domingo Quiroz MD.         My EKG interpretation sinus rhythm rate of 99, PVC                                MDM  Patient was advised the findings.  She was ordered Aircast and crutches for the small avulsion at the medial malleolus.  There is no apparent instability at the ankle.  The knee x-ray shows no acute bony injury.  She was given pain medication in the emergency room for discomfort.  She remained hemodynamically  stable during the emergency room course.  EKG shows no acute ischemic changes, initial troponin is normal.  She is not describing chest pain or shortness of breath or any focal neurologic symptoms to suggest stroke.  She will be placed in further observation for overnight cardiac monitoring serial cardiac enzymes and 2D echocardiogram in the a.m.  She will be referred to orthopedics following discharge for her medial malleolus fracture.  Patient is agreeable to plan  Final diagnoses:   Syncope, unspecified syncope type   Nondisplaced fracture of medial malleolus of left tibia, initial encounter for closed fracture   Contusion of left knee, initial encounter       ED Disposition  ED Disposition     ED Disposition Condition Comment    Decision to Admit            No follow-up provider specified.       Medication List      No changes were made to your prescriptions during this visit.          Max Lima MD  06/13/21 0608

## 2021-06-14 ENCOUNTER — APPOINTMENT (OUTPATIENT)
Dept: CARDIOLOGY | Facility: HOSPITAL | Age: 51
End: 2021-06-14

## 2021-06-14 ENCOUNTER — APPOINTMENT (OUTPATIENT)
Dept: RESPIRATORY THERAPY | Facility: HOSPITAL | Age: 51
End: 2021-06-14

## 2021-06-14 VITALS
HEART RATE: 77 BPM | BODY MASS INDEX: 44.41 KG/M2 | SYSTOLIC BLOOD PRESSURE: 128 MMHG | WEIGHT: 293 LBS | TEMPERATURE: 97.8 F | OXYGEN SATURATION: 99 % | HEIGHT: 68 IN | DIASTOLIC BLOOD PRESSURE: 70 MMHG | RESPIRATION RATE: 14 BRPM

## 2021-06-14 LAB
ANION GAP SERPL CALCULATED.3IONS-SCNC: 11 MMOL/L (ref 5–15)
BASOPHILS # BLD AUTO: 0.1 10*3/MM3 (ref 0–0.2)
BASOPHILS NFR BLD AUTO: 1 % (ref 0–1.5)
BH CV ECHO MEAS - % IVS THICK: 41.4 %
BH CV ECHO MEAS - % LVPW THICK: 25.5 %
BH CV ECHO MEAS - ACS: 2 CM
BH CV ECHO MEAS - AO MAX PG (FULL): 0.12 MMHG
BH CV ECHO MEAS - AO MAX PG: 7.9 MMHG
BH CV ECHO MEAS - AO MEAN PG (FULL): -0.06 MMHG
BH CV ECHO MEAS - AO MEAN PG: 4.3 MMHG
BH CV ECHO MEAS - AO ROOT AREA (BSA CORRECTED): 1
BH CV ECHO MEAS - AO ROOT AREA: 5.5 CM^2
BH CV ECHO MEAS - AO ROOT DIAM: 2.7 CM
BH CV ECHO MEAS - AO V2 MAX: 140.2 CM/SEC
BH CV ECHO MEAS - AO V2 MEAN: 99.2 CM/SEC
BH CV ECHO MEAS - AO V2 VTI: 28.8 CM
BH CV ECHO MEAS - AVA(I,A): 3.4 CM^2
BH CV ECHO MEAS - AVA(I,D): 3.4 CM^2
BH CV ECHO MEAS - AVA(V,A): 2.9 CM^2
BH CV ECHO MEAS - AVA(V,D): 2.9 CM^2
BH CV ECHO MEAS - BSA(HAYCOCK): 2.8 M^2
BH CV ECHO MEAS - BSA: 2.5 M^2
BH CV ECHO MEAS - BZI_BMI: 50.8 KILOGRAMS/M^2
BH CV ECHO MEAS - BZI_METRIC_HEIGHT: 172.7 CM
BH CV ECHO MEAS - BZI_METRIC_WEIGHT: 151.5 KG
BH CV ECHO MEAS - EDV(CUBED): 117.3 ML
BH CV ECHO MEAS - EDV(MOD-SP4): 95.9 ML
BH CV ECHO MEAS - EDV(TEICH): 112.5 ML
BH CV ECHO MEAS - EF(CUBED): 80.1 %
BH CV ECHO MEAS - EF(MOD-BP): 67 %
BH CV ECHO MEAS - EF(MOD-SP4): 67.2 %
BH CV ECHO MEAS - EF(TEICH): 72.4 %
BH CV ECHO MEAS - ESV(CUBED): 23.3 ML
BH CV ECHO MEAS - ESV(MOD-SP4): 31.4 ML
BH CV ECHO MEAS - ESV(TEICH): 31.1 ML
BH CV ECHO MEAS - FS: 41.6 %
BH CV ECHO MEAS - IVS/LVPW: 1
BH CV ECHO MEAS - IVSD: 1.2 CM
BH CV ECHO MEAS - IVSS: 1.7 CM
BH CV ECHO MEAS - LA DIMENSION(2D): 4 CM
BH CV ECHO MEAS - LV DIASTOLIC VOL/BSA (35-75): 37.7 ML/M^2
BH CV ECHO MEAS - LV MASS(C)D: 228.3 GRAMS
BH CV ECHO MEAS - LV MASS(C)DI: 89.8 GRAMS/M^2
BH CV ECHO MEAS - LV MASS(C)S: 168.8 GRAMS
BH CV ECHO MEAS - LV MASS(C)SI: 66.4 GRAMS/M^2
BH CV ECHO MEAS - LV MAX PG: 7.7 MMHG
BH CV ECHO MEAS - LV MEAN PG: 4.3 MMHG
BH CV ECHO MEAS - LV SYSTOLIC VOL/BSA (12-30): 12.4 ML/M^2
BH CV ECHO MEAS - LV V1 MAX: 139.2 CM/SEC
BH CV ECHO MEAS - LV V1 MEAN: 97.4 CM/SEC
BH CV ECHO MEAS - LV V1 VTI: 33 CM
BH CV ECHO MEAS - LVIDD: 4.9 CM
BH CV ECHO MEAS - LVIDS: 2.9 CM
BH CV ECHO MEAS - LVOT AREA: 3 CM^2
BH CV ECHO MEAS - LVOT DIAM: 1.9 CM
BH CV ECHO MEAS - LVPWD: 1.2 CM
BH CV ECHO MEAS - LVPWS: 1.5 CM
BH CV ECHO MEAS - MV A MAX VEL: 67 CM/SEC
BH CV ECHO MEAS - MV DEC SLOPE: 515.8 CM/SEC^2
BH CV ECHO MEAS - MV DEC TIME: 0.2 SEC
BH CV ECHO MEAS - MV E MAX VEL: 52 CM/SEC
BH CV ECHO MEAS - MV E/A: 0.78
BH CV ECHO MEAS - MV MAX PG: 3.6 MMHG
BH CV ECHO MEAS - MV MEAN PG: 1.5 MMHG
BH CV ECHO MEAS - MV V2 MAX: 95.2 CM/SEC
BH CV ECHO MEAS - MV V2 MEAN: 57.1 CM/SEC
BH CV ECHO MEAS - MV V2 VTI: 25.5 CM
BH CV ECHO MEAS - MVA(VTI): 3.8 CM^2
BH CV ECHO MEAS - PA ACC TIME: 0.08 SEC
BH CV ECHO MEAS - PA MAX PG (FULL): 2.1 MMHG
BH CV ECHO MEAS - PA MAX PG: 4.6 MMHG
BH CV ECHO MEAS - PA MEAN PG (FULL): 1.3 MMHG
BH CV ECHO MEAS - PA MEAN PG: 2.6 MMHG
BH CV ECHO MEAS - PA PR(ACCEL): 44.7 MMHG
BH CV ECHO MEAS - PA V2 MAX: 106.9 CM/SEC
BH CV ECHO MEAS - PA V2 MEAN: 77.7 CM/SEC
BH CV ECHO MEAS - PA V2 VTI: 27.2 CM
BH CV ECHO MEAS - PULM A REVS DUR: 0.08 SEC
BH CV ECHO MEAS - PULM A REVS VEL: 33.8 CM/SEC
BH CV ECHO MEAS - PULM DIAS VEL: 31.1 CM/SEC
BH CV ECHO MEAS - PULM S/D: 1.7
BH CV ECHO MEAS - PULM SYS VEL: 53.5 CM/SEC
BH CV ECHO MEAS - PVA(I,A): 3.7 CM^2
BH CV ECHO MEAS - PVA(I,D): 3.7 CM^2
BH CV ECHO MEAS - PVA(V,A): 4.1 CM^2
BH CV ECHO MEAS - PVA(V,D): 4.1 CM^2
BH CV ECHO MEAS - QP/QS: 1
BH CV ECHO MEAS - RAP SYSTOLE: 15 MMHG
BH CV ECHO MEAS - RV MAX PG: 2.4 MMHG
BH CV ECHO MEAS - RV MEAN PG: 1.3 MMHG
BH CV ECHO MEAS - RV V1 MAX: 78.1 CM/SEC
BH CV ECHO MEAS - RV V1 MEAN: 52.5 CM/SEC
BH CV ECHO MEAS - RV V1 VTI: 17.8 CM
BH CV ECHO MEAS - RVDD: 2.6 CM
BH CV ECHO MEAS - RVOT AREA: 5.7 CM^2
BH CV ECHO MEAS - RVOT DIAM: 2.7 CM
BH CV ECHO MEAS - RVSP: 49 MMHG
BH CV ECHO MEAS - SI(AO): 62.7 ML/M^2
BH CV ECHO MEAS - SI(CUBED): 37 ML/M^2
BH CV ECHO MEAS - SI(LVOT): 38.4 ML/M^2
BH CV ECHO MEAS - SI(MOD-SP4): 25.4 ML/M^2
BH CV ECHO MEAS - SI(TEICH): 32 ML/M^2
BH CV ECHO MEAS - SV(AO): 159.4 ML
BH CV ECHO MEAS - SV(CUBED): 93.9 ML
BH CV ECHO MEAS - SV(LVOT): 97.6 ML
BH CV ECHO MEAS - SV(MOD-SP4): 64.5 ML
BH CV ECHO MEAS - SV(RVOT): 100.9 ML
BH CV ECHO MEAS - SV(TEICH): 81.5 ML
BH CV ECHO MEAS - TR MAX VEL: 291.7 CM/SEC
BUN SERPL-MCNC: 12 MG/DL (ref 6–20)
BUN/CREAT SERPL: 15.6 (ref 7–25)
CALCIUM SPEC-SCNC: 8.7 MG/DL (ref 8.6–10.5)
CHLORIDE SERPL-SCNC: 104 MMOL/L (ref 98–107)
CO2 SERPL-SCNC: 24 MMOL/L (ref 22–29)
CREAT SERPL-MCNC: 0.77 MG/DL (ref 0.57–1)
DEPRECATED RDW RBC AUTO: 45.5 FL (ref 37–54)
EOSINOPHIL # BLD AUTO: 0.1 10*3/MM3 (ref 0–0.4)
EOSINOPHIL NFR BLD AUTO: 0.9 % (ref 0.3–6.2)
ERYTHROCYTE [DISTWIDTH] IN BLOOD BY AUTOMATED COUNT: 15.6 % (ref 12.3–15.4)
GFR SERPL CREATININE-BSD FRML MDRD: 95 ML/MIN/1.73
GLUCOSE SERPL-MCNC: 99 MG/DL (ref 65–99)
HCT VFR BLD AUTO: 37.2 % (ref 34–46.6)
HGB BLD-MCNC: 12.4 G/DL (ref 12–15.9)
LYMPHOCYTES # BLD AUTO: 2.1 10*3/MM3 (ref 0.7–3.1)
LYMPHOCYTES NFR BLD AUTO: 22.2 % (ref 19.6–45.3)
MAXIMAL PREDICTED HEART RATE: 168 BPM
MCH RBC QN AUTO: 27.8 PG (ref 26.6–33)
MCHC RBC AUTO-ENTMCNC: 33.4 G/DL (ref 31.5–35.7)
MCV RBC AUTO: 83.2 FL (ref 79–97)
MONOCYTES # BLD AUTO: 0.5 10*3/MM3 (ref 0.1–0.9)
MONOCYTES NFR BLD AUTO: 5.5 % (ref 5–12)
NEUTROPHILS NFR BLD AUTO: 6.8 10*3/MM3 (ref 1.7–7)
NEUTROPHILS NFR BLD AUTO: 70.4 % (ref 42.7–76)
NRBC BLD AUTO-RTO: 0.2 /100 WBC (ref 0–0.2)
PLATELET # BLD AUTO: 162 10*3/MM3 (ref 140–450)
PMV BLD AUTO: 8 FL (ref 6–12)
POTASSIUM SERPL-SCNC: 4 MMOL/L (ref 3.5–5.2)
QT INTERVAL: 351 MS
RBC # BLD AUTO: 4.48 10*6/MM3 (ref 3.77–5.28)
SODIUM SERPL-SCNC: 139 MMOL/L (ref 136–145)
STRESS TARGET HR: 143 BPM
TROPONIN T SERPL-MCNC: <0.01 NG/ML (ref 0–0.03)
WBC # BLD AUTO: 9.6 10*3/MM3 (ref 3.4–10.8)

## 2021-06-14 PROCEDURE — 93306 TTE W/DOPPLER COMPLETE: CPT

## 2021-06-14 PROCEDURE — G0378 HOSPITAL OBSERVATION PER HR: HCPCS

## 2021-06-14 PROCEDURE — 93225 XTRNL ECG REC<48 HRS REC: CPT

## 2021-06-14 PROCEDURE — 80048 BASIC METABOLIC PNL TOTAL CA: CPT | Performed by: EMERGENCY MEDICINE

## 2021-06-14 PROCEDURE — 85025 COMPLETE CBC W/AUTO DIFF WBC: CPT | Performed by: EMERGENCY MEDICINE

## 2021-06-14 PROCEDURE — 84484 ASSAY OF TROPONIN QUANT: CPT | Performed by: EMERGENCY MEDICINE

## 2021-06-14 PROCEDURE — 93306 TTE W/DOPPLER COMPLETE: CPT | Performed by: INTERNAL MEDICINE

## 2021-06-14 RX ORDER — HYDROCODONE BITARTRATE AND ACETAMINOPHEN 7.5; 325 MG/1; MG/1
1 TABLET ORAL EVERY 6 HOURS PRN
Qty: 12 TABLET | Refills: 0 | Status: SHIPPED | OUTPATIENT
Start: 2021-06-14

## 2021-06-14 RX ADMIN — HYDROCODONE BITARTRATE AND ACETAMINOPHEN 1 TABLET: 10; 325 TABLET ORAL at 02:31

## 2021-06-14 RX ADMIN — Medication 10 ML: at 20:23

## 2021-06-14 RX ADMIN — HYDROCODONE BITARTRATE AND ACETAMINOPHEN 1 TABLET: 10; 325 TABLET ORAL at 07:57

## 2021-06-14 RX ADMIN — Medication 10 ML: at 08:42

## 2021-06-14 RX ADMIN — HYDROCODONE BITARTRATE AND ACETAMINOPHEN 1 TABLET: 10; 325 TABLET ORAL at 18:31

## 2021-06-14 RX ADMIN — SODIUM CHLORIDE, POTASSIUM CHLORIDE, SODIUM LACTATE AND CALCIUM CHLORIDE 500 ML: 600; 310; 30; 20 INJECTION, SOLUTION INTRAVENOUS at 08:42

## 2021-06-14 RX ADMIN — HYDROCODONE BITARTRATE AND ACETAMINOPHEN 1 TABLET: 10; 325 TABLET ORAL at 12:39

## 2021-06-14 NOTE — PLAN OF CARE
Problem: Adult Inpatient Plan of Care  Goal: Plan of Care Review  Outcome: Ongoing, Progressing  Goal: Patient-Specific Goal (Individualized)  Outcome: Ongoing, Progressing  Goal: Absence of Hospital-Acquired Illness or Injury  Outcome: Ongoing, Progressing  Intervention: Identify and Manage Fall Risk  Recent Flowsheet Documentation  Taken 6/14/2021 1309 by Chanelle Goodman RN  Safety Promotion/Fall Prevention:   assistive device/personal items within reach   clutter free environment maintained   nonskid shoes/slippers when out of bed   safety round/check completed  Taken 6/14/2021 1100 by Chanelle Goodman RN  Safety Promotion/Fall Prevention:   assistive device/personal items within reach   clutter free environment maintained   safety round/check completed  Intervention: Prevent Skin Injury  Recent Flowsheet Documentation  Taken 6/14/2021 1100 by Chanelle Goodman RN  Body Position:   position changed independently   supine  Skin Protection: adhesive use limited  Intervention: Prevent Infection  Recent Flowsheet Documentation  Taken 6/14/2021 1309 by Chanelle Goodman RN  Infection Prevention:   environmental surveillance performed   personal protective equipment utilized   rest/sleep promoted   single patient room provided  Taken 6/14/2021 1100 by Chanelle Goodman RN  Infection Prevention:   environmental surveillance performed   personal protective equipment utilized   rest/sleep promoted   single patient room provided  Goal: Optimal Comfort and Wellbeing  Outcome: Ongoing, Progressing  Intervention: Provide Person-Centered Care  Recent Flowsheet Documentation  Taken 6/14/2021 1100 by Chanelle Goodman RN  Trust Relationship/Rapport:   care explained   choices provided   questions answered  Goal: Readiness for Transition of Care  Outcome: Ongoing, Progressing     Problem: Fall Injury Risk  Goal: Absence of Fall and Fall-Related Injury  Outcome: Ongoing, Progressing  Intervention: Identify and Manage Contributors to Fall  Injury Risk  Recent Flowsheet Documentation  Taken 6/14/2021 1100 by Chanelle Goodman RN  Medication Review/Management: medications reviewed  Intervention: Promote Injury-Free Environment  Recent Flowsheet Documentation  Taken 6/14/2021 1309 by Chanelle Goodman RN  Safety Promotion/Fall Prevention:   assistive device/personal items within reach   clutter free environment maintained   nonskid shoes/slippers when out of bed   safety round/check completed  Taken 6/14/2021 1100 by Chanelle Goodman RN  Safety Promotion/Fall Prevention:   assistive device/personal items within reach   clutter free environment maintained   safety round/check completed     Problem: Syncope  Goal: Absence of Syncopal Symptoms  Outcome: Ongoing, Progressing  Intervention: Manage Effect of Syncopal Symptoms  Recent Flowsheet Documentation  Taken 6/14/2021 1100 by Chanelle Goodman RN  Syncope Management: position changed slowly  Supportive Measures: active listening utilized     Problem: Pain Acute  Goal: Optimal Pain Control  Outcome: Ongoing, Progressing  Intervention: Develop Pain Management Plan  Recent Flowsheet Documentation  Taken 6/14/2021 1239 by Chanelle Goodman RN  Pain Management Interventions: see MAR  Taken 6/14/2021 1100 by Chanelle Goodman RN  Pain Management Interventions:   medication offered but refused   pain management plan reviewed with patient/caregiver  Intervention: Prevent or Manage Pain  Recent Flowsheet Documentation  Taken 6/14/2021 1100 by Chanelle Goodman RN  Sensory Stimulation Regulation: care clustered  Sleep/Rest Enhancement: awakenings minimized  Intervention: Optimize Psychosocial Wellbeing  Recent Flowsheet Documentation  Taken 6/14/2021 1100 by Chanelle Goodman RN  Supportive Measures: active listening utilized  Diversional Activities: smartphone   Goal Outcome Evaluation:     ECHO done, awaiting results. Holter monitor placed. Vitals WNL. Will continue to monitor.

## 2021-06-14 NOTE — PLAN OF CARE
Problem: Adult Inpatient Plan of Care  Goal: Plan of Care Review  Outcome: Ongoing, Progressing  Flowsheets (Taken 6/13/2021 5100)  Progress: no change  Plan of Care Reviewed With: patient  Outcome Summary: Pt arrived from ED  Goal: Patient-Specific Goal (Individualized)  Outcome: Ongoing, Progressing  Goal: Absence of Hospital-Acquired Illness or Injury  Outcome: Ongoing, Progressing  Goal: Optimal Comfort and Wellbeing  Outcome: Ongoing, Progressing  Goal: Readiness for Transition of Care  Outcome: Ongoing, Progressing     Problem: Fall Injury Risk  Goal: Absence of Fall and Fall-Related Injury  Outcome: Ongoing, Progressing     Problem: Syncope  Goal: Absence of Syncopal Symptoms  Outcome: Ongoing, Progressing     Problem: Pain Acute  Goal: Optimal Pain Control  Outcome: Ongoing, Progressing   Goal Outcome Evaluation:  Plan of Care Reviewed With: patient        Progress: no change  Outcome Summary: Pt arrived from ED

## 2021-06-14 NOTE — PLAN OF CARE
Problem: Adult Inpatient Plan of Care  Goal: Plan of Care Review  6/14/2021 1710 by Chanelle Goodman RN  Outcome: Met  6/14/2021 1635 by Chanelle Goodman RN  Outcome: Ongoing, Progressing  Goal: Patient-Specific Goal (Individualized)  6/14/2021 1710 by Chanelle Goodman RN  Outcome: Met  6/14/2021 1635 by Chanelle Goodman RN  Outcome: Ongoing, Progressing  Goal: Absence of Hospital-Acquired Illness or Injury  6/14/2021 1710 by Chanelle Goodman RN  Outcome: Met  6/14/2021 1635 by Chanelle Goodman RN  Outcome: Ongoing, Progressing  Intervention: Identify and Manage Fall Risk  Recent Flowsheet Documentation  Taken 6/14/2021 1500 by Chanelle Goodman RN  Safety Promotion/Fall Prevention:   assistive device/personal items within reach   clutter free environment maintained   fall prevention program maintained   safety round/check completed  Taken 6/14/2021 1309 by Chanelle Goodman RN  Safety Promotion/Fall Prevention:   assistive device/personal items within reach   clutter free environment maintained   nonskid shoes/slippers when out of bed   safety round/check completed  Taken 6/14/2021 1100 by Chanelle Goodman RN  Safety Promotion/Fall Prevention:   assistive device/personal items within reach   clutter free environment maintained   safety round/check completed  Intervention: Prevent Skin Injury  Recent Flowsheet Documentation  Taken 6/14/2021 1500 by Chanelle Goodman RN  Body Position:   position changed independently   supine  Taken 6/14/2021 1100 by Chanelle Goodman RN  Body Position:   position changed independently   supine  Skin Protection: adhesive use limited  Intervention: Prevent Infection  Recent Flowsheet Documentation  Taken 6/14/2021 1500 by Chanelle Goodman RN  Infection Prevention:   environmental surveillance performed   personal protective equipment utilized   rest/sleep promoted   single patient room provided  Taken 6/14/2021 1309 by Chanelle Goodman RN  Infection Prevention:   environmental surveillance  performed   personal protective equipment utilized   rest/sleep promoted   single patient room provided  Taken 6/14/2021 1100 by Chanelle Goodman RN  Infection Prevention:   environmental surveillance performed   personal protective equipment utilized   rest/sleep promoted   single patient room provided  Goal: Optimal Comfort and Wellbeing  6/14/2021 1710 by Chanelle Goodman RN  Outcome: Met  6/14/2021 1635 by Chanelle Goodman RN  Outcome: Ongoing, Progressing  Intervention: Provide Person-Centered Care  Recent Flowsheet Documentation  Taken 6/14/2021 1100 by Chanelle Goodman RN  Trust Relationship/Rapport:   care explained   choices provided   questions answered  Goal: Readiness for Transition of Care  6/14/2021 1710 by Chanelle Goodman RN  Outcome: Met  6/14/2021 1635 by Chanelle Goodman RN  Outcome: Ongoing, Progressing     Problem: Fall Injury Risk  Goal: Absence of Fall and Fall-Related Injury  6/14/2021 1710 by Chanelle Goodman RN  Outcome: Met  6/14/2021 1635 by Chanelle Goodman RN  Outcome: Ongoing, Progressing  Intervention: Identify and Manage Contributors to Fall Injury Risk  Recent Flowsheet Documentation  Taken 6/14/2021 1500 by Chanelle Goodman RN  Medication Review/Management: medications reviewed  Taken 6/14/2021 1100 by Chanelle Goodman RN  Medication Review/Management: medications reviewed  Intervention: Promote Injury-Free Environment  Recent Flowsheet Documentation  Taken 6/14/2021 1500 by Chanelle Goodman RN  Safety Promotion/Fall Prevention:   assistive device/personal items within reach   clutter free environment maintained   fall prevention program maintained   safety round/check completed  Taken 6/14/2021 1309 by Chanelle Goodman RN  Safety Promotion/Fall Prevention:   assistive device/personal items within reach   clutter free environment maintained   nonskid shoes/slippers when out of bed   safety round/check completed  Taken 6/14/2021 1100 by Chanelle Goodman RN  Safety Promotion/Fall Prevention:    assistive device/personal items within reach   clutter free environment maintained   safety round/check completed     Problem: Syncope  Goal: Absence of Syncopal Symptoms  6/14/2021 1710 by Chanelle Goodman RN  Outcome: Met  6/14/2021 1635 by Chanelle Goodman RN  Outcome: Ongoing, Progressing  Intervention: Manage Effect of Syncopal Symptoms  Recent Flowsheet Documentation  Taken 6/14/2021 1100 by Chanelle Goodman RN  Syncope Management: position changed slowly  Supportive Measures: active listening utilized     Problem: Pain Acute  Goal: Optimal Pain Control  6/14/2021 1710 by Chanelle Goodman RN  Outcome: Met  6/14/2021 1635 by Chanelle Goodman RN  Outcome: Ongoing, Progressing  Intervention: Develop Pain Management Plan  Recent Flowsheet Documentation  Taken 6/14/2021 1239 by Chanelle Goodman RN  Pain Management Interventions: see MAR  Taken 6/14/2021 1100 by Chanelle Goodman RN  Pain Management Interventions:   medication offered but refused   pain management plan reviewed with patient/caregiver  Intervention: Prevent or Manage Pain  Recent Flowsheet Documentation  Taken 6/14/2021 1100 by Chanelle Goodman RN  Sensory Stimulation Regulation: care clustered  Sleep/Rest Enhancement: awakenings minimized  Intervention: Optimize Psychosocial Wellbeing  Recent Flowsheet Documentation  Taken 6/14/2021 1100 by Chanelle Goodman RN  Supportive Measures: active listening utilized  Diversional Activities: smartphone   Goal Outcome Evaluation:      Patient discharged.

## 2021-06-14 NOTE — DISCHARGE SUMMARY
[facility]    Kat Crystal APRN    CHIEF COMPLAINT:     Syncope    HISTORY OF PRESENT ILLNESS:    50-year-old female states she was shopping today and started feeling lightheaded had a slight bit of nausea and had a brief syncopal spell with a friend and the Nexium she remembered she was on the floor.  States she does have some knee pain from falling but denies headache or chest pain or palpitation or shortness of breath.  She reports no abdominal pain or fevers or chills or vomiting or diarrhea.  She states she feels fine currently other than the knee pain.  She denies numbness or weakness in the extremities    Initial labs notable for creatinine of 1.03 with a BUN of 16 and a BUN great ratio of 15.5, WBCs: 11.40 with an increased absolute neutrophil count noted on differential.  Remainder of CBC and CMP was generally unremarkable.  INR, PT and PTT were all within normal limits.  CT of head without contrast showed no acute intracranial process.  X-ray of left ankle showed an acute nondisplaced fracture at the distal tip of the medial malleolus with a small bony fragment along the distal tip of the lateral malleolus possibly representing a small age-indeterminate avulsion fracture with no lateral soft tissue swelling noted.  X-ray of left knee shows degenerative changes with no acute bony abnormality or joint effusion.  Chest x-ray showed no acute cardiopulmonary disease.  EKG showed sinus rhythm at 99 with 2 unifocal PVCs    6/14/2021: Patient confirms HPI noted above states that she experienced a short period of nausea prior to her syncopal episode and woke on the ground.  She has had some left ankle and knee pain since that time and left ankle continues immobilized with intact distal neurovascular function noted.  No recurrence of syncope, dyspnea, chest pain, nausea or vomiting, diaphoresis, fever chills or changes in bowel or bladder habits have occurred since her admission.    Past  Medical History:   Diagnosis Date   • A-fib (CMS/HCC)    • Anxiety      History reviewed. No pertinent surgical history.  Family History   Problem Relation Age of Onset   • Hypertension Mother    • Hypertension Father    • Diabetes Father    • Breast cancer Maternal Grandmother    • Diabetes Paternal Grandmother      Social History     Tobacco Use   • Smoking status: Never Smoker   Substance Use Topics   • Alcohol use: Yes     Comment: occ/social   • Drug use: Never     Medications Prior to Admission   Medication Sig Dispense Refill Last Dose   • apixaban (ELIQUIS) 5 MG tablet tablet TAKE 1 TABLET BY MOUTH TWO TIMES A DAY      • metoprolol succinate XL (TOPROL-XL) 100 MG 24 hr tablet Take 100 mg by mouth Daily.        Allergies:  Penicillins      There is no immunization history on file for this patient.        REVIEW OF SYSTEMS:    Review of Systems   Constitutional: Negative.   HENT: Negative.    Eyes: Negative.    Cardiovascular: Positive for syncope. Negative for orthopnea and palpitations.   Respiratory: Negative.    Endocrine: Negative.    Skin: Negative.    Musculoskeletal: Negative.    Gastrointestinal: Positive for nausea. Negative for abdominal pain, constipation, diarrhea, hematemesis, jaundice, melena and vomiting.   Psychiatric/Behavioral: Negative.        Vital Signs  Temp:  [97.7 °F (36.5 °C)-98.3 °F (36.8 °C)] 97.7 °F (36.5 °C)  Heart Rate:  [65-97] 65  Resp:  [16-18] 16  BP: ()/(51-80) 91/51          Physical Exam:  Physical Exam  Constitutional:       General: She is not in acute distress.     Appearance: Normal appearance. She is obese. She is not ill-appearing, toxic-appearing or diaphoretic.   HENT:      Head: Normocephalic and atraumatic.      Right Ear: External ear normal.      Left Ear: External ear normal.      Nose: Nose normal.      Mouth/Throat:      Mouth: Mucous membranes are moist.   Eyes:      Extraocular Movements: Extraocular movements intact.   Cardiovascular:      Rate and  Rhythm: Normal rate and regular rhythm.      Pulses: Normal pulses.      Heart sounds: Normal heart sounds.   Pulmonary:      Effort: Pulmonary effort is normal.      Breath sounds: Normal breath sounds.   Abdominal:      General: Bowel sounds are normal. There is no distension.      Tenderness: There is no abdominal tenderness.   Musculoskeletal:         General: Normal range of motion.      Cervical back: Normal range of motion.   Skin:     General: Skin is warm and dry.      Capillary Refill: Capillary refill takes less than 2 seconds.   Neurological:      General: No focal deficit present.      Mental Status: She is alert and oriented to person, place, and time.   Psychiatric:         Mood and Affect: Mood normal.         Behavior: Behavior normal.         Thought Content: Thought content normal.         Judgment: Judgment normal.         Emotional Behavior:   Normal   Debilities:  None  Results Review:    I reviewed the patient's new clinical results.  Lab Results (most recent)     Procedure Component Value Units Date/Time    Basic Metabolic Panel [[id number]]  (Normal) Collected: 06/14/21 0249    Specimen: Blood Updated: 06/14/21 0403     Glucose 99 mg/dL      BUN 12 mg/dL      Creatinine 0.77 mg/dL      Sodium 139 mmol/L      Potassium 4.0 mmol/L      Chloride 104 mmol/L      CO2 24.0 mmol/L      Calcium 8.7 mg/dL      eGFR  African Amer 95 mL/min/1.73      BUN/Creatinine Ratio 15.6     Anion Gap 11.0 mmol/L     Narrative:      GFR Normal >60  Chronic Kidney Disease <60  Kidney Failure <15      Troponin [[id number]]  (Normal) Collected: 06/14/21 0249    Specimen: Blood Updated: 06/14/21 0403     Troponin T <0.010 ng/mL     Narrative:      Troponin T Reference Range:  <= 0.03 ng/mL-   Negative for AMI  >0.03 ng/mL-     Abnormal for myocardial necrosis.  Clinicians would have to utilize clinical acumen, EKG, Troponin and serial changes to determine if it is an Acute Myocardial Infarction or myocardial injury due  to an underlying chronic condition.       Results may be falsely decreased if patient taking Biotin.      CBC Auto Differential [[id number]]  (Abnormal) Collected: 06/14/21 0249    Specimen: Blood Updated: 06/14/21 0338     WBC 9.60 10*3/mm3      RBC 4.48 10*6/mm3      Hemoglobin 12.4 g/dL      Hematocrit 37.2 %      MCV 83.2 fL      MCH 27.8 pg      MCHC 33.4 g/dL      RDW 15.6 %      RDW-SD 45.5 fl      MPV 8.0 fL      Platelets 162 10*3/mm3      Neutrophil % 70.4 %      Lymphocyte % 22.2 %      Monocyte % 5.5 %      Eosinophil % 0.9 %      Basophil % 1.0 %      Neutrophils, Absolute 6.80 10*3/mm3      Lymphocytes, Absolute 2.10 10*3/mm3      Monocytes, Absolute 0.50 10*3/mm3      Eosinophils, Absolute 0.10 10*3/mm3      Basophils, Absolute 0.10 10*3/mm3      nRBC 0.2 /100 WBC     COVID PRE-OP / PRE-PROCEDURE SCREENING ORDER (NO ISOLATION) - Swab, Nasopharynx [[id number]]  (Normal) Collected: 06/13/21 2210    Specimen: Swab from Nasopharynx Updated: 06/13/21 2314    Narrative:      The following orders were created for panel order COVID PRE-OP / PRE-PROCEDURE SCREENING ORDER (NO ISOLATION) - Swab, Nasopharynx.  Procedure                               Abnormality         Status                     ---------                               -----------         ------                     COVID-19,CEPHEID,COR/FAUSTINO...[[id number]]  Normal              Final result                 Please view results for these tests on the individual orders.    COVID-19,CEPHEID,COR/FAUSTINO/PAD/MARGARETH IN-HOUSE(OR EMERGENT/ADD-ON),NP SWAB IN TRANSPORT MEDIA 3-4 HR TAT, RT-PCR - Swab, Nasopharynx [[id number]]  (Normal) Collected: 06/13/21 2210    Specimen: Swab from Nasopharynx Updated: 06/13/21 2314     COVID19 Not Detected    Narrative:      Fact sheet for providers: https://www.fda.gov/media/413637/download     Fact sheet for patients: https://www.fda.gov/media/851627/download  Fact sheet for providers: https://www.fda.gov/media/999804/download          Fact sheet for patients: https://www.fda.gov/media/031769/download    Comprehensive Metabolic Panel [[id number]]  (Abnormal) Collected: 06/13/21 1938    Specimen: Blood Updated: 06/13/21 2010     Glucose 69 mg/dL      BUN 16 mg/dL      Creatinine 1.03 mg/dL      Sodium 138 mmol/L      Potassium 3.9 mmol/L      Chloride 101 mmol/L      CO2 24.0 mmol/L      Calcium 9.3 mg/dL      Total Protein 8.0 g/dL      Albumin 4.10 g/dL      ALT (SGPT) 11 U/L      AST (SGOT) 15 U/L      Alkaline Phosphatase 101 U/L      Total Bilirubin 0.3 mg/dL      eGFR  African Amer 68 mL/min/1.73      Globulin 3.9 gm/dL      A/G Ratio 1.1 g/dL      BUN/Creatinine Ratio 15.5     Anion Gap 13.0 mmol/L     Narrative:      GFR Normal >60  Chronic Kidney Disease <60  Kidney Failure <15      Troponin [[id number]]  (Normal) Collected: 06/13/21 1938    Specimen: Blood Updated: 06/13/21 2008     Troponin T <0.010 ng/mL     Narrative:      Troponin T Reference Range:  <= 0.03 ng/mL-   Negative for AMI  >0.03 ng/mL-     Abnormal for myocardial necrosis.  Clinicians would have to utilize clinical acumen, EKG, Troponin and serial changes to determine if it is an Acute Myocardial Infarction or myocardial injury due to an underlying chronic condition.       Results may be falsely decreased if patient taking Biotin.      BNP [[id number]]  (Normal) Collected: 06/13/21 1938    Specimen: Blood Updated: 06/13/21 2006     proBNP 19.4 pg/mL     Narrative:      Among patients with dyspnea, NT-proBNP is highly sensitive for the detection of acute congestive heart failure. In addition NT-proBNP of <300 pg/ml effectively rules out acute congestive heart failure with 99% negative predictive value.    Results may be falsely decreased if patient taking Biotin.      Protime-INR [[id number]]  (Normal) Collected: 06/13/21 1938    Specimen: Blood Updated: 06/13/21 1956     Protime 10.6 Seconds      INR 0.96    aPTT [[id number]]  (Normal) Collected: 06/13/21 1938    Specimen:  Blood Updated: 06/13/21 1956     PTT 24.6 seconds     CBC & Differential [[id number]]  (Abnormal) Collected: 06/13/21 1938    Specimen: Blood Updated: 06/13/21 1946    Narrative:      The following orders were created for panel order CBC & Differential.  Procedure                               Abnormality         Status                     ---------                               -----------         ------                     CBC Auto Differential[[id number]]        Abnormal            Final result                 Please view results for these tests on the individual orders.    CBC Auto Differential [[id number]]  (Abnormal) Collected: 06/13/21 1938    Specimen: Blood Updated: 06/13/21 1946     WBC 11.40 10*3/mm3      RBC 4.79 10*6/mm3      Hemoglobin 13.1 g/dL      Hematocrit 40.2 %      MCV 83.8 fL      MCH 27.4 pg      MCHC 32.7 g/dL      RDW 15.5 %      RDW-SD 45.1 fl      MPV 8.2 fL      Platelets 187 10*3/mm3      Neutrophil % 76.7 %      Lymphocyte % 15.6 %      Monocyte % 6.3 %      Eosinophil % 0.8 %      Basophil % 0.6 %      Neutrophils, Absolute 8.80 10*3/mm3      Lymphocytes, Absolute 1.80 10*3/mm3      Monocytes, Absolute 0.70 10*3/mm3      Eosinophils, Absolute 0.10 10*3/mm3      Basophils, Absolute 0.10 10*3/mm3      nRBC 0.0 /100 WBC           Imaging Results (Most Recent)     Procedure Component Value Units Date/Time    CT Head Without Contrast [[id number]] Collected: 06/13/21 2016     Updated: 06/13/21 2018    Narrative:      EXAMINATION: CT HEAD WITHOUT CONTRAST    DATE: 6/13/2021 7:57 PM     INDICATION: Fall. Syncope. Weakness.     COMPARISON: None.     TECHNIQUE:  Routine axial images through the head without contrast. Coronal reformations.    Low-dose CT acquisition technique included one or more of the following options: 1. Automated exposure control, 2. Adjustment of mA and/or KV according to patient's size and/or 3. Use of iterative reconstruction.    FINDINGS:      Intracranial contents:    The  ventricles and cisternal spaces are normal in size, shape and configuration for a patient of this age. Grey white differentiation is preserved.  No dominant mass, midline shift, hydrocephalus, extra-axial fluid collection or acute hemorrhage.      No asymmetric hyperdensity of the proximal major cerebral arteries.    Craniocervical junction is patent.    Bones and extracranial soft tissues:    Minor mucosal thickening ethmoid air cells. Otherwise, Visualized paranasal sinuses and mastoid air cells are clear.  Skull is intact. Visualized intraorbital contents are unremarkable. Degenerative changes temporomandibular joints.      Impression:      1. No acute intracranial process. MRI is more sensitive for the detection of acute nonhemorrhagic infarct.         Electronically signed by:  Awais Mcguire M.D.    6/13/2021 6:17 PM    XR Chest 1 View [[id number]] Collected: 06/13/21 1948     Updated: 06/13/21 1951    Narrative:      XR CHEST 1 VW-     Date of Exam: 6/13/2021 7:08 PM     Indication: syncope.     Comparison: 11/03/2020     Technique: A single view of the chest was obtained.     FINDINGS:      The heart size and pulmonary vessels are within normal limits. The  lungs are clear bilaterally. There are no pleural effusions. Bony thorax  is unremarkable.                Impression:         1. No acute cardiopulmonary disease.        Electronically Signed By-Domingo Quiroz MD On:6/13/2021 7:49 PM  This report was finalized on [id number] by  Domingo Quiroz MD.    XR Ankle 3+ View Left [[id number]] Collected: 06/13/21 1946     Updated: 06/13/21 1950    Narrative:      XR ANKLE 3+ VW LEFT-     Date of Exam: 6/13/2021 7:10 PM     Indication: trauma.  Pain lateral side of ankle     Comparison: None available.     Technique: Three views of the ankle were obtained.     FINDINGS:  There is medial soft tissue swelling.  There is an acute  fracture along the distal tip of the medial malleolus.  Ankle mortise  appears  intact.  There is a 3 mm bony fragment along the distal tip of  the lateral malleolus which may represent a small avulsion fracture.  No  significant lateral soft tissue swelling.  Subtalar joint is within  normal limits.  There is enthesophyte formation along the plantar  surface of the calcaneus.  Degenerative spurring is noted along the  dorsal surfaces of the tarsal bones.          Impression:         1.  Acute nondisplaced fracture the distal tip of the medial malleolus.  2.  Small bony fragment along the distal tip of the lateral malleolus  which may represent a small age-indeterminate avulsion fracture.  No  lateral soft tissue swelling.     Electronically Signed By-Domingo Quiroz MD On:6/13/2021 7:48 PM  This report was finalized on [id number] by  Domingo Quiroz MD.    XR Knee 4+ View Left [[id number]] Collected: 06/13/21 1945     Updated: 06/13/21 1949    Narrative:      Examination: XR KNEE 4+ VW LEFT-     Date of Exam: 6/13/2021 7:08 PM     Indication: trauma.     Comparison: None available.     Technique: Multiple radiographic views of the knee were obtained.     Findings: There is moderate narrowing of medial joint compartment.   Lateral joint compartment appears within normal in its.  There is  degenerative spurring of the intercondylar notch.  No acute fracture or  dislocation is seen.  There are degenerative changes of the  patellofemoral joint with joint space narrowing and lateral osteophyte  formation.  No joint effusion identified.             Impression:         Degenerative changes of the left knee.  No acute bony abnormality or  joint effusion.     Electronically Signed By-Domingo Quiroz MD On:6/13/2021 7:46 PM  This report was finalized on [id number] by  Domingo Quiroz MD.        reviewed    ECG/EMG Results (most recent)     Procedure Component Value Units Date/Time    ECG 12 Lead [[id number]] Collected: 06/13/21 1848     Updated: 06/13/21 1850     QT Interval 351 ms     Narrative:       HEART RATE= 99  bpm  RR Interval= 604  ms  KS Interval= 162  ms  P Horizontal Axis= 2  deg  P Front Axis= 64  deg  QRSD Interval= 100  ms  QT Interval= 351  ms  QRS Axis= -33  deg  T Wave Axis= 22  deg  - ABNORMAL ECG -  Sinus rhythm  Multiple ventricular premature complexes  Left axis deviation  Electronically Signed By:   Date and Time of Study: 2021-06-13 18:48:32    Adult Transthoracic Echo Complete W/ Cont if Necessary Per Protocol [[id number]] Collected: 06/14/21 0912     Updated: 06/14/21 1345     BSA 2.5 m^2      RVIDd 2.6 cm      IVSd 1.2 cm      IVSs 1.7 cm      LVIDd 4.9 cm      LVIDs 2.9 cm      LVPWd 1.2 cm      BH CV ECHO HERMINIO - LVPWS 1.5 cm      IVS/LVPW 1.0     FS 41.6 %      EDV(Teich) 112.5 ml      ESV(Teich) 31.1 ml      EF(Teich) 72.4 %      EDV(cubed) 117.3 ml      ESV(cubed) 23.3 ml      EF(cubed) 80.1 %      % IVS thick 41.4 %      % LVPW thick 25.5 %      LV mass(C)d 228.3 grams      LV mass(C)dI 89.8 grams/m^2      LV mass(C)s 168.8 grams      LV mass(C)sI 66.4 grams/m^2      SV(Teich) 81.5 ml      SI(Teich) 32.0 ml/m^2      SV(cubed) 93.9 ml      SI(cubed) 37.0 ml/m^2      Ao root diam 2.7 cm      Ao root area 5.5 cm^2      ACS 2.0 cm      LVOT diam 1.9 cm      LVOT area 3.0 cm^2      RVOT diam 2.7 cm      RVOT area 5.7 cm^2      EDV(MOD-sp4) 95.9 ml      ESV(MOD-sp4) 31.4 ml      EF(MOD-sp4) 67.2 %      SV(MOD-sp4) 64.5 ml      SI(MOD-sp4) 25.4 ml/m^2      Ao root area (BSA corrected) 1.0     LV Perez Vol (BSA corrected) 37.7 ml/m^2      LV Sys Vol (BSA corrected) 12.4 ml/m^2      MV E max nikos 52.0 cm/sec      MV A max nikos 67.0 cm/sec      MV E/A 0.78     MV V2 max 95.2 cm/sec      MV max PG 3.6 mmHg      MV V2 mean 57.1 cm/sec      MV mean PG 1.5 mmHg      MV V2 VTI 25.5 cm      MVA(VTI) 3.8 cm^2      MV dec slope 515.8 cm/sec^2      MV dec time 0.2 sec      Ao pk nikos 140.2 cm/sec      Ao max PG 7.9 mmHg      Ao max PG (full) 0.12 mmHg      Ao V2 mean 99.2 cm/sec      Ao mean PG 4.3 mmHg         Ao mean PG (full) -0.06 mmHg      Ao V2 VTI 28.8 cm      KATHY(I,A) 3.4 cm^2      KATHY(I,D) 3.4 cm^2      KATHY(V,A) 2.9 cm^2      KATHY(V,D) 2.9 cm^2      LV V1 max PG 7.7 mmHg      LV V1 mean PG 4.3 mmHg      LV V1 max 139.2 cm/sec      LV V1 mean 97.4 cm/sec      LV V1 VTI 33.0 cm      SV(Ao) 159.4 ml      SI(Ao) 62.7 ml/m^2      SV(LVOT) 97.6 ml      SV(RVOT) 100.9 ml      SI(LVOT) 38.4 ml/m^2      PA V2 max 106.9 cm/sec      PA max PG 4.6 mmHg      PA max PG (full) 2.1 mmHg      PA V2 mean 77.7 cm/sec      PA mean PG 2.6 mmHg      PA mean PG (full) 1.3 mmHg      PA V2 VTI 27.2 cm      PVA(I,A) 3.7 cm^2       CV ECHO HERMINIO - PVA(I,D) 3.7 cm^2       CV ECHO HERMINIO - PVA(V,A) 4.1 cm^2       CV ECHO HERMINIO - PVA(V,D) 4.1 cm^2      PA acc time 0.08 sec      RV V1 max PG 2.4 mmHg      RV V1 mean PG 1.3 mmHg      RV V1 max 78.1 cm/sec      RV V1 mean 52.5 cm/sec      RV V1 VTI 17.8 cm      TR max imtiaz 291.7 cm/sec      RVSP(TR) 49.0 mmHg      RAP systole 15.0 mmHg      PA pr(Accel) 44.7 mmHg      Pulm Sys Imtiaz 53.5 cm/sec      Pulm Perez Imtiaz 31.1 cm/sec      Pulm S/D 1.7     Qp/Qs 1.0     Pulm A Revs Dur 0.08 sec      Pulm A Revs Imtiza 33.8 cm/sec       CV ECHO HERMINIO - BZI_BMI 50.8 kilograms/m^2       CV ECHO HERMINIO - BSA([facility]) 2.8 m^2       CV ECHO HERMINIO - BZI_METRIC_WEIGHT 151.5 kg       CV ECHO HERMINIO - BZI_METRIC_HEIGHT 172.7 cm      Target HR (85%) 143 bpm      Max. Pred. HR (100%) 168 bpm      EF(MOD-bp) 67.0 %      LA dimension(2D) 4.0 cm         reviewed            Microbiology Results (last 10 days)     Procedure Component Value - Date/Time    COVID PRE-OP / PRE-PROCEDURE SCREENING ORDER (NO ISOLATION) - Swab, Nasopharynx [[id number]]  (Normal) Collected: 06/13/21 2210    Lab Status: Final result Specimen: Swab from Nasopharynx Updated: 06/13/21 1943    Narrative:      The following orders were created for panel order COVID PRE-OP / PRE-PROCEDURE SCREENING ORDER (NO ISOLATION) - Swab, Nasopharynx.  Procedure                                Abnormality         Status                     ---------                               -----------         ------                     COVID-19,CEPHEID,COR/FAUSTINO...[[id number]]  Normal              Final result                 Please view results for these tests on the individual orders.    COVID-19,CEPHEID,COR/FAUSTINO/PAD/[facility]ARG[facility] IN-HOUSE(OR EMERGENT/ADD-ON),NP SWAB IN TRANSPORT MEDIA 3-4 HR TAT, RT-PCR - Swab, Nasopharynx [[id number]]  (Normal) Collected: 06/13/21 2210    Lab Status: Final result Specimen: Swab from Nasopharynx Updated: 06/13/21 2314     COVID19 Not Detected    Narrative:      Fact sheet for providers: https://www.fda.gov/media/587887/download     Fact sheet for patients: https://www.fda.gov/media/569680/download  Fact sheet for providers: https://www.fda.gov/media/700458/download     Fact sheet for patients: https://www.fda.gov/media/224053/download          Assessment/Plan     Syncope       -CT of head without contrast showed no acute intracranial process.  -X-ray of left ankle showed an acute nondisplaced fracture at the distal tip of the medial malleolus with a small bony fragment along the distal tip of the lateral malleolus possibly representing a small age-indeterminate avulsion fracture with no lateral soft tissue swelling noted.    -X-ray of left knee shows degenerative changes with no acute bony abnormality or joint effusion.    -Chest x-ray showed no acute cardiopulmonary disease.    -EKG showed sinus rhythm at 99 with 2 unifocal PVCs  -Echocardiogram results pending and will be called to ED attending prior to discharge  -Holter monitor ordered at discharge  -Follow-up with PCP in 5 to 7 days and cardiology in 2 weeks  -Patient encouraged to maintain adequate hydration monitor I's and O's      I discussed the patients findings and my recommendations with patient and family.     Discharge Diagnosis:      Syncope      Hospital Course  Patient is a 50 y.o. female presented  following a syncopal episode and HPI noted above.  Some left knee and ankle pain was reported following the incident and subsequent x-rays did show an acute nondisplaced fracture at the distal tip of the medial malleolus with a small bony fragment along the distal tip of the lateral malleolus possibly representing a small age-indeterminate avulsion fracture without lateral soft tissue swelling reported.  X-ray of the knee and chest x-ray showed no acute cardiopulmonary abnormalities.  EKG showed 2 unifocal PVCs but with otherwise unremarkable.  Echocardiogram was ordered on 6/14/2021 with complete results pending but calculated EF initially noted at 67%.  Patient has denied any recurrence of syncopal symptoms since her admission.  She will be discharged pending any approval following release of full echocardiogram report with a 24-hour Holter monitor as well as a referral to orthopedic surgery for evaluation of left lower extremity injuries.  Short course of Norco was also prescribed along with instructions to avoid driving while taking this medication.  Her testing and results were discussed with patient and family along with concerning/alarm symptoms for which to call 911/return to the ED and patient verbalizes her understanding and agreement.    Past Medical History:     Past Medical History:   Diagnosis Date   • A-fib (CMS/[facility])    • Anxiety        Past Surgical History:   History reviewed. No pertinent surgical history.    Social History:   Social History     Socioeconomic History   • Marital status:      Spouse name: Not on file   • Number of children: Not on file   • Years of education: Not on file   • Highest education level: Not on file   Tobacco Use   • Smoking status: Never Smoker   Substance and Sexual Activity   • Alcohol use: Yes     Comment: occ/social   • Drug use: Never       Procedures Performed         Consults:   Consults     No orders found for last 30 day(s).          Condition on  Discharge:     Stable    Discharge Disposition      Discharge Medications     Discharge Medications      ASK your doctor about these medications      Instructions Start Date   Eliquis 5 MG tablet tablet  Generic drug: apixaban   TAKE 1 TABLET BY MOUTH TWO TIMES A DAY      metoprolol succinate  MG 24 hr tablet  Commonly known as: TOPROL-XL   100 mg, Oral, Daily             Discharge Diet:     Activity at Discharge:     Follow-up Appointments  No future appointments.      Test Results Pending at Discharge       Risk for Readmission (LACE) Score: 1 (6/14/2021  6:01 AM)          Austin Powell PA-C  06/14/21  13:59 EDT

## 2021-06-15 NOTE — CASE MANAGEMENT/SOCIAL WORK
Case Management Discharge Note      Final Note: home         Selected Continued Care - Discharged on 6/14/2021 Admission date: 6/13/2021 - Discharge disposition: Home or Self Care                 Final Discharge Disposition Code: 01 - home or self-care

## 2021-06-16 PROCEDURE — 93227 XTRNL ECG REC<48 HR R&I: CPT | Performed by: INTERNAL MEDICINE

## 2021-11-17 ENCOUNTER — IMMUNIZATION (OUTPATIENT)
Dept: VACCINE CLINIC | Facility: HOSPITAL | Age: 51
End: 2021-11-17

## 2021-11-17 PROCEDURE — 91300 HC SARSCOV02 VAC 30MCG/0.3ML IM: CPT | Performed by: INTERNAL MEDICINE

## 2021-11-17 PROCEDURE — 0004A ADM SARSCOV2 30MCG/0.3ML BOOSTER: CPT | Performed by: INTERNAL MEDICINE
